# Patient Record
Sex: FEMALE | Race: WHITE
[De-identification: names, ages, dates, MRNs, and addresses within clinical notes are randomized per-mention and may not be internally consistent; named-entity substitution may affect disease eponyms.]

---

## 2018-06-22 ENCOUNTER — HOSPITAL ENCOUNTER (INPATIENT)
Age: 68
LOS: 5 days | Discharge: HOME | DRG: 641 | End: 2018-06-27

## 2018-06-22 ENCOUNTER — HOSPITAL ENCOUNTER (INPATIENT)
Dept: HOSPITAL 91 - E/R | Age: 68
LOS: 5 days | Discharge: HOME | DRG: 641 | End: 2018-06-27
Payer: MEDICAID

## 2018-06-22 DIAGNOSIS — N17.9: ICD-10-CM

## 2018-06-22 DIAGNOSIS — E03.9: ICD-10-CM

## 2018-06-22 DIAGNOSIS — I45.81: ICD-10-CM

## 2018-06-22 DIAGNOSIS — I10: ICD-10-CM

## 2018-06-22 DIAGNOSIS — E83.51: Primary | ICD-10-CM

## 2018-06-22 LAB
ADD MAN DIFF?: NO
ADD UMIC: YES
ALANINE AMINOTRANSFERASE: 22 IU/L (ref 13–69)
ALBUMIN/GLOBULIN RATIO: 1.12
ALBUMIN: 4.5 G/DL (ref 3.3–4.9)
ALKALINE PHOSPHATASE: 87 IU/L (ref 42–121)
ANION GAP: 21 (ref 8–16)
ASPARTATE AMINO TRANSFERASE: 18 IU/L (ref 15–46)
BASOPHIL #: 0 10^3/UL (ref 0–0.1)
BASOPHILS %: 0.4 % (ref 0–2)
BILIRUBIN,DIRECT: 0 MG/DL (ref 0–0.2)
BILIRUBIN,TOTAL: 0.3 MG/DL (ref 0.2–1.3)
BLOOD UREA NITROGEN: 27 MG/DL (ref 7–20)
CALCIUM: 5.7 MG/DL (ref 8.4–10.2)
CARBON DIOXIDE: 27 MMOL/L (ref 21–31)
CHLORIDE: 99 MMOL/L (ref 97–110)
CREATININE: 1.38 MG/DL (ref 0.44–1)
EOSINOPHILS #: 0.1 10^3/UL (ref 0–0.5)
EOSINOPHILS %: 1 % (ref 0–7)
GLOBULIN: 4 G/DL (ref 1.3–3.2)
GLUCOSE: 109 MG/DL (ref 70–220)
HEMATOCRIT: 36.1 % (ref 37–47)
HEMOGLOBIN: 11.1 G/DL (ref 12–16)
IONIZED CALCIUM: 0.7 MMOL/L (ref 1.1–1.4)
LIPASE: 281 U/L (ref 23–300)
LYMPHOCYTES #: 2.6 10^3/UL (ref 0.8–2.9)
LYMPHOCYTES %: 23.9 % (ref 15–51)
MAGNESIUM: 1.8 MG/DL (ref 1.7–2.5)
MEAN CORPUSCULAR HEMOGLOBIN: 24.9 PG (ref 29–33)
MEAN CORPUSCULAR HGB CONC: 30.7 G/DL (ref 32–37)
MEAN CORPUSCULAR VOLUME: 80.9 FL (ref 82–101)
MEAN PLATELET VOLUME: 10.4 FL (ref 7.4–10.4)
MONOCYTE #: 0.8 10^3/UL (ref 0.3–0.9)
MONOCYTES %: 7.9 % (ref 0–11)
NEUTROPHIL #: 7.1 10^3/UL (ref 1.6–7.5)
NEUTROPHILS %: 66.3 % (ref 39–77)
NUCLEATED RED BLOOD CELLS #: 0 10^3/UL (ref 0–0)
NUCLEATED RED BLOOD CELLS%: 0 /100WBC (ref 0–0)
PHOSPHORUS: 7.4 MG/DL (ref 2.5–4.9)
PLATELET COUNT: 323 10^3/UL (ref 140–415)
POTASSIUM: 4.5 MMOL/L (ref 3.5–5.1)
RED BLOOD COUNT: 4.46 10^6/UL (ref 4.2–5.4)
RED CELL DISTRIBUTION WIDTH: 16.5 % (ref 11.5–14.5)
SODIUM: 142 MMOL/L (ref 135–144)
TOTAL PROTEIN: 8.5 G/DL (ref 6.1–8.1)
UR ASCORBIC ACID: NEGATIVE MG/DL
UR BACTERIA: (no result) /HPF
UR BILIRUBIN (DIP): NEGATIVE MG/DL
UR BLOOD (DIP): (no result) MG/DL
UR CLARITY: CLEAR
UR COLOR: YELLOW
UR GLUCOSE (DIP): NEGATIVE MG/DL
UR KETONES (DIP): NEGATIVE MG/DL
UR LEUKOCYTE ESTERASE (DIP): NEGATIVE LEU/UL
UR NITRITE (DIP): NEGATIVE MG/DL
UR NONSQUAMOUS EPITHELIAL CELL: 1 /HPF
UR PH (DIP): 5 (ref 5–9)
UR RBC: 1 /HPF (ref 0–5)
UR SPECIFIC GRAVITY (DIP): 1.01 (ref 1–1.03)
UR SQUAMOUS EPITHELIAL CELL: (no result) /HPF
UR TOTAL PROTEIN (DIP): NEGATIVE MG/DL
UR UROBILINOGEN (DIP): NEGATIVE MG/DL
UR WBC: 1 /HPF (ref 0–5)
WHITE BLOOD COUNT: 10.7 10^3/UL (ref 4.8–10.8)

## 2018-06-22 PROCEDURE — 71045 X-RAY EXAM CHEST 1 VIEW: CPT

## 2018-06-22 PROCEDURE — 84100 ASSAY OF PHOSPHORUS: CPT

## 2018-06-22 PROCEDURE — 84443 ASSAY THYROID STIM HORMONE: CPT

## 2018-06-22 PROCEDURE — 82306 VITAMIN D 25 HYDROXY: CPT

## 2018-06-22 PROCEDURE — 83970 ASSAY OF PARATHORMONE: CPT

## 2018-06-22 PROCEDURE — 80053 COMPREHEN METABOLIC PANEL: CPT

## 2018-06-22 PROCEDURE — 80069 RENAL FUNCTION PANEL: CPT

## 2018-06-22 PROCEDURE — 82728 ASSAY OF FERRITIN: CPT

## 2018-06-22 PROCEDURE — 82607 VITAMIN B-12: CPT

## 2018-06-22 PROCEDURE — 83735 ASSAY OF MAGNESIUM: CPT

## 2018-06-22 PROCEDURE — 76536 US EXAM OF HEAD AND NECK: CPT

## 2018-06-22 PROCEDURE — 82330 ASSAY OF CALCIUM: CPT

## 2018-06-22 PROCEDURE — 82652 VIT D 1 25-DIHYDROXY: CPT

## 2018-06-22 PROCEDURE — 81001 URINALYSIS AUTO W/SCOPE: CPT

## 2018-06-22 PROCEDURE — 80048 BASIC METABOLIC PNL TOTAL CA: CPT

## 2018-06-22 PROCEDURE — 83690 ASSAY OF LIPASE: CPT

## 2018-06-22 PROCEDURE — 36415 COLL VENOUS BLD VENIPUNCTURE: CPT

## 2018-06-22 PROCEDURE — 99285 EMERGENCY DEPT VISIT HI MDM: CPT

## 2018-06-22 PROCEDURE — 93005 ELECTROCARDIOGRAM TRACING: CPT

## 2018-06-22 PROCEDURE — 81003 URINALYSIS AUTO W/O SCOPE: CPT

## 2018-06-22 PROCEDURE — 82310 ASSAY OF CALCIUM: CPT

## 2018-06-22 PROCEDURE — 96374 THER/PROPH/DIAG INJ IV PUSH: CPT

## 2018-06-22 PROCEDURE — 85025 COMPLETE CBC W/AUTO DIFF WBC: CPT

## 2018-06-22 PROCEDURE — 83540 ASSAY OF IRON: CPT

## 2018-06-22 RX ADMIN — THIAMINE HYDROCHLORIDE 1 MLS/HR: 100 INJECTION, SOLUTION INTRAMUSCULAR; INTRAVENOUS at 18:25

## 2018-06-22 RX ADMIN — MAGNESIUM OXIDE TAB 400 MG (241.3 MG ELEMENTAL MG) 1 MG: 400 (241.3 MG) TAB at 19:38

## 2018-06-22 RX ADMIN — CALCIUM GLUCONATE 1 MLS/HR: 94 INJECTION, SOLUTION INTRAVENOUS at 19:38

## 2018-06-23 LAB
ADD MAN DIFF?: NO
ADD UMIC: NO
ALANINE AMINOTRANSFERASE: 22 IU/L (ref 13–69)
ALBUMIN/GLOBULIN RATIO: 1.17
ALBUMIN: 4 G/DL (ref 3.3–4.9)
ALKALINE PHOSPHATASE: 77 IU/L (ref 42–121)
ANION GAP: 19 (ref 8–16)
ASPARTATE AMINO TRANSFERASE: 15 IU/L (ref 15–46)
BASOPHIL #: 0.1 10^3/UL (ref 0–0.1)
BASOPHILS %: 0.4 % (ref 0–2)
BILIRUBIN,DIRECT: 0 MG/DL (ref 0–0.2)
BILIRUBIN,TOTAL: 0.1 MG/DL (ref 0.2–1.3)
BLOOD UREA NITROGEN: 24 MG/DL (ref 7–20)
CALCIUM: 5.9 MG/DL (ref 8.4–10.2)
CALCIUM: 6.5 MG/DL (ref 8.4–10.2)
CARBON DIOXIDE: 29 MMOL/L (ref 21–31)
CHLORIDE: 99 MMOL/L (ref 97–110)
CREATININE: 1.18 MG/DL (ref 0.44–1)
EOSINOPHILS #: 0.1 10^3/UL (ref 0–0.5)
EOSINOPHILS %: 0.4 % (ref 0–7)
GLOBULIN: 3.4 G/DL (ref 1.3–3.2)
GLUCOSE: 104 MG/DL (ref 70–220)
HEMATOCRIT: 32.6 % (ref 37–47)
HEMOGLOBIN: 10.2 G/DL (ref 12–16)
IONIZED CALCIUM: 0.7 MMOL/L (ref 1.1–1.4)
LYMPHOCYTES #: 1.5 10^3/UL (ref 0.8–2.9)
LYMPHOCYTES %: 12.6 % (ref 15–51)
MAGNESIUM: 1.9 MG/DL (ref 1.7–2.5)
MEAN CORPUSCULAR HEMOGLOBIN: 25.1 PG (ref 29–33)
MEAN CORPUSCULAR HGB CONC: 31.3 G/DL (ref 32–37)
MEAN CORPUSCULAR VOLUME: 80.3 FL (ref 82–101)
MEAN PLATELET VOLUME: 10.9 FL (ref 7.4–10.4)
MONOCYTE #: 1 10^3/UL (ref 0.3–0.9)
MONOCYTES %: 8.4 % (ref 0–11)
NEUTROPHIL #: 9.2 10^3/UL (ref 1.6–7.5)
NEUTROPHILS %: 77.8 % (ref 39–77)
NUCLEATED RED BLOOD CELLS #: 0 10^3/UL (ref 0–0)
NUCLEATED RED BLOOD CELLS%: 0 /100WBC (ref 0–0)
PLATELET COUNT: 300 10^3/UL (ref 140–415)
POTASSIUM: 3.8 MMOL/L (ref 3.5–5.1)
PROTEIN URINE: 8 MG/DL (ref 0–11.9)
RED BLOOD COUNT: 4.06 10^6/UL (ref 4.2–5.4)
RED CELL DISTRIBUTION WIDTH: 16.4 % (ref 11.5–14.5)
SODIUM: 143 MMOL/L (ref 135–144)
THYROID STIMULATING HORMONE: 2.24 MIU/L (ref 0.47–4.68)
TOTAL PROTEIN: 7.4 G/DL (ref 6.1–8.1)
UR ASCORBIC ACID: NEGATIVE MG/DL
UR BILIRUBIN (DIP): NEGATIVE MG/DL
UR BLOOD (DIP): NEGATIVE MG/DL
UR CLARITY: CLEAR
UR COLOR: YELLOW
UR GLUCOSE (DIP): NEGATIVE MG/DL
UR KETONES (DIP): (no result) MG/DL
UR LEUKOCYTE ESTERASE (DIP): NEGATIVE LEU/UL
UR NITRITE (DIP): NEGATIVE MG/DL
UR PH (DIP): 6 (ref 5–9)
UR SPECIFIC GRAVITY (DIP): 1.01 (ref 1–1.03)
UR TOTAL PROTEIN (DIP): NEGATIVE MG/DL
UR UROBILINOGEN (DIP): NEGATIVE MG/DL
WHITE BLOOD COUNT: 11.8 10^3/UL (ref 4.8–10.8)

## 2018-06-23 RX ADMIN — CALCIUM 1 GM: 500 TABLET ORAL at 12:53

## 2018-06-23 RX ADMIN — THIAMINE HYDROCHLORIDE 1 MLS/HR: 100 INJECTION, SOLUTION INTRAMUSCULAR; INTRAVENOUS at 15:24

## 2018-06-23 RX ADMIN — MORPHINE SULFATE 1 MG: 2 INJECTION, SOLUTION INTRAMUSCULAR; INTRAVENOUS at 09:50

## 2018-06-23 RX ADMIN — HYDRALAZINE HYDROCHLORIDE 1 MG: 20 INJECTION INTRAMUSCULAR; INTRAVENOUS at 09:51

## 2018-06-23 RX ADMIN — ACETAMINOPHEN 1 MG: 325 TABLET, FILM COATED ORAL at 19:42

## 2018-06-23 RX ADMIN — CALCIUM 1 GM: 500 TABLET ORAL at 20:50

## 2018-06-23 RX ADMIN — HYDRALAZINE HYDROCHLORIDE 1 MG: 20 INJECTION INTRAMUSCULAR; INTRAVENOUS at 01:44

## 2018-06-23 RX ADMIN — LEVOTHYROXINE SODIUM 1 MCG: 100 TABLET ORAL at 06:18

## 2018-06-23 RX ADMIN — AMLODIPINE BESYLATE 1 MG: 10 TABLET ORAL at 11:32

## 2018-06-23 RX ADMIN — CALCIUM ACETATE 1 MG: 667 CAPSULE ORAL at 12:53

## 2018-06-23 RX ADMIN — CALCIUM ACETATE 1 MG: 667 CAPSULE ORAL at 17:11

## 2018-06-23 RX ADMIN — CALCIUM GLUCONATE 1 MLS/HR: 94 INJECTION, SOLUTION INTRAVENOUS at 09:50

## 2018-06-23 RX ADMIN — THIAMINE HYDROCHLORIDE 1 MLS/HR: 100 INJECTION, SOLUTION INTRAMUSCULAR; INTRAVENOUS at 23:01

## 2018-06-23 RX ADMIN — ACETAMINOPHEN 1 MG: 325 TABLET, FILM COATED ORAL at 11:32

## 2018-06-23 RX ADMIN — AMLODIPINE BESYLATE 1 MG: 5 TABLET ORAL at 19:43

## 2018-06-23 RX ADMIN — CALCIUM GLUCONATE 1 MLS/HR: 94 INJECTION, SOLUTION INTRAVENOUS at 15:24

## 2018-06-23 RX ADMIN — HYDRALAZINE HYDROCHLORIDE 1 MG: 20 INJECTION INTRAMUSCULAR; INTRAVENOUS at 19:43

## 2018-06-24 LAB
ADD MAN DIFF?: NO
ALANINE AMINOTRANSFERASE: 18 IU/L (ref 13–69)
ALBUMIN/GLOBULIN RATIO: 1.2
ALBUMIN: 4.1 G/DL (ref 3.3–4.9)
ALKALINE PHOSPHATASE: 83 IU/L (ref 42–121)
ANION GAP: 20 (ref 8–16)
ASPARTATE AMINO TRANSFERASE: 14 IU/L (ref 15–46)
BASOPHIL #: 0.1 10^3/UL (ref 0–0.1)
BASOPHILS %: 0.4 % (ref 0–2)
BILIRUBIN,DIRECT: 0 MG/DL (ref 0–0.2)
BILIRUBIN,TOTAL: 0.3 MG/DL (ref 0.2–1.3)
BLOOD UREA NITROGEN: 13 MG/DL (ref 7–20)
CALCIUM: 7.4 MG/DL (ref 8.4–10.2)
CARBON DIOXIDE: 26 MMOL/L (ref 21–31)
CHLORIDE: 97 MMOL/L (ref 97–110)
CREATININE: 0.87 MG/DL (ref 0.44–1)
EOSINOPHILS #: 0 10^3/UL (ref 0–0.5)
EOSINOPHILS %: 0.1 % (ref 0–7)
GLOBULIN: 3.4 G/DL (ref 1.3–3.2)
GLUCOSE: 124 MG/DL (ref 70–220)
HEMATOCRIT: 34.8 % (ref 37–47)
HEMOGLOBIN: 10.7 G/DL (ref 12–16)
LYMPHOCYTES #: 1.2 10^3/UL (ref 0.8–2.9)
LYMPHOCYTES %: 9.3 % (ref 15–51)
MAGNESIUM: 1.5 MG/DL (ref 1.7–2.5)
MEAN CORPUSCULAR HEMOGLOBIN: 24.9 PG (ref 29–33)
MEAN CORPUSCULAR HGB CONC: 30.7 G/DL (ref 32–37)
MEAN CORPUSCULAR VOLUME: 81.1 FL (ref 82–101)
MEAN PLATELET VOLUME: 11.1 FL (ref 7.4–10.4)
MONOCYTE #: 0.8 10^3/UL (ref 0.3–0.9)
MONOCYTES %: 6.3 % (ref 0–11)
NEUTROPHIL #: 11.1 10^3/UL (ref 1.6–7.5)
NEUTROPHILS %: 82.9 % (ref 39–77)
NUCLEATED RED BLOOD CELLS #: 0 10^3/UL (ref 0–0)
NUCLEATED RED BLOOD CELLS%: 0 /100WBC (ref 0–0)
PHOSPHORUS: 5.6 MG/DL (ref 2.5–4.9)
PLATELET COUNT: 336 10^3/UL (ref 140–415)
POTASSIUM: 3.8 MMOL/L (ref 3.5–5.1)
RED BLOOD COUNT: 4.29 10^6/UL (ref 4.2–5.4)
RED CELL DISTRIBUTION WIDTH: 16.7 % (ref 11.5–14.5)
SODIUM: 139 MMOL/L (ref 135–144)
TOTAL PROTEIN: 7.5 G/DL (ref 6.1–8.1)
WHITE BLOOD COUNT: 13.4 10^3/UL (ref 4.8–10.8)

## 2018-06-24 RX ADMIN — PYRIDOXINE HYDROCHLORIDE 1 MLS/HR: 100 INJECTION, SOLUTION INTRAMUSCULAR; INTRAVENOUS at 11:08

## 2018-06-24 RX ADMIN — CALCIUM ACETATE 1 MG: 667 CAPSULE ORAL at 09:43

## 2018-06-24 RX ADMIN — CALCIUM 1 GM: 500 TABLET ORAL at 09:43

## 2018-06-24 RX ADMIN — CALCIUM ACETATE 1 MG: 667 CAPSULE ORAL at 17:24

## 2018-06-24 RX ADMIN — AMLODIPINE BESYLATE 1 MG: 10 TABLET ORAL at 09:43

## 2018-06-24 RX ADMIN — ONDANSETRON HYDROCHLORIDE 1 MG: 2 INJECTION, SOLUTION INTRAMUSCULAR; INTRAVENOUS at 11:09

## 2018-06-24 RX ADMIN — HYDRALAZINE HYDROCHLORIDE 1 MG: 20 INJECTION INTRAMUSCULAR; INTRAVENOUS at 20:30

## 2018-06-24 RX ADMIN — CALCIUM ACETATE 1 MG: 667 CAPSULE ORAL at 12:42

## 2018-06-24 RX ADMIN — LEVOTHYROXINE SODIUM 1 MCG: 100 TABLET ORAL at 06:07

## 2018-06-24 RX ADMIN — HYDRALAZINE HYDROCHLORIDE 1 MG: 20 INJECTION INTRAMUSCULAR; INTRAVENOUS at 03:41

## 2018-06-24 RX ADMIN — THIAMINE HYDROCHLORIDE 1 MLS/HR: 100 INJECTION, SOLUTION INTRAMUSCULAR; INTRAVENOUS at 06:41

## 2018-06-24 RX ADMIN — CALCIUM 1 GM: 500 TABLET ORAL at 20:29

## 2018-06-24 RX ADMIN — MAGNESIUM SULFATE HEPTAHYDRATE 1 MLS/HR: 40 INJECTION, SOLUTION INTRAVENOUS at 11:08

## 2018-06-25 LAB
ADD MAN DIFF?: NO
ALANINE AMINOTRANSFERASE: 21 IU/L (ref 13–69)
ALBUMIN/GLOBULIN RATIO: 1.15
ALBUMIN: 3.7 G/DL (ref 3.3–4.9)
ALKALINE PHOSPHATASE: 68 IU/L (ref 42–121)
ANION GAP: 14 (ref 8–16)
ASPARTATE AMINO TRANSFERASE: 14 IU/L (ref 15–46)
BASOPHIL #: 0 10^3/UL (ref 0–0.1)
BASOPHILS %: 0.2 % (ref 0–2)
BILIRUBIN,DIRECT: 0 MG/DL (ref 0–0.2)
BILIRUBIN,TOTAL: 0.3 MG/DL (ref 0.2–1.3)
BLOOD UREA NITROGEN: 17 MG/DL (ref 7–20)
CALCIUM: 7 MG/DL (ref 8.4–10.2)
CARBON DIOXIDE: 32 MMOL/L (ref 21–31)
CHLORIDE: 96 MMOL/L (ref 97–110)
CREATININE: 1.05 MG/DL (ref 0.44–1)
EOSINOPHILS #: 0.1 10^3/UL (ref 0–0.5)
EOSINOPHILS %: 0.4 % (ref 0–7)
GLOBULIN: 3.2 G/DL (ref 1.3–3.2)
GLUCOSE: 104 MG/DL (ref 70–220)
HEMATOCRIT: 31 % (ref 37–47)
HEMOGLOBIN: 9.7 G/DL (ref 12–16)
LYMPHOCYTES #: 1.5 10^3/UL (ref 0.8–2.9)
LYMPHOCYTES %: 12.3 % (ref 15–51)
MAGNESIUM: 2.1 MG/DL (ref 1.7–2.5)
MEAN CORPUSCULAR HEMOGLOBIN: 25.5 PG (ref 29–33)
MEAN CORPUSCULAR HGB CONC: 31.3 G/DL (ref 32–37)
MEAN CORPUSCULAR VOLUME: 81.6 FL (ref 82–101)
MEAN PLATELET VOLUME: 10.7 FL (ref 7.4–10.4)
MONOCYTE #: 0.9 10^3/UL (ref 0.3–0.9)
MONOCYTES %: 7.5 % (ref 0–11)
NEUTROPHIL #: 9.9 10^3/UL (ref 1.6–7.5)
NEUTROPHILS %: 79.2 % (ref 39–77)
NUCLEATED RED BLOOD CELLS #: 0 10^3/UL (ref 0–0)
NUCLEATED RED BLOOD CELLS%: 0 /100WBC (ref 0–0)
PHOSPHORUS: 5.1 MG/DL (ref 2.5–4.9)
PLATELET COUNT: 301 10^3/UL (ref 140–415)
POTASSIUM: 4.3 MMOL/L (ref 3.5–5.1)
PTH-C SERPL-SCNC: 6.7 MG/DL (ref 8.6–10.4)
RED BLOOD COUNT: 3.8 10^6/UL (ref 4.2–5.4)
RED CELL DISTRIBUTION WIDTH: 16.7 % (ref 11.5–14.5)
SODIUM: 138 MMOL/L (ref 135–144)
TOTAL PROTEIN: 6.9 G/DL (ref 6.1–8.1)
WHITE BLOOD COUNT: 12.6 10^3/UL (ref 4.8–10.8)

## 2018-06-25 RX ADMIN — CALCIUM 1 GM: 500 TABLET ORAL at 13:07

## 2018-06-25 RX ADMIN — CALCITRIOL CAPSULES 0.25 MCG 1 MCG: 0.25 CAPSULE ORAL at 08:59

## 2018-06-25 RX ADMIN — AMLODIPINE BESYLATE 1 MG: 10 TABLET ORAL at 09:01

## 2018-06-25 RX ADMIN — CALCIUM 1 GM: 500 TABLET ORAL at 21:00

## 2018-06-25 RX ADMIN — CALCIUM ACETATE 1 MG: 667 CAPSULE ORAL at 17:26

## 2018-06-25 RX ADMIN — LEVOTHYROXINE SODIUM 1 MCG: 100 TABLET ORAL at 06:13

## 2018-06-25 RX ADMIN — CALCIUM ACETATE 1 MG: 667 CAPSULE ORAL at 13:07

## 2018-06-25 RX ADMIN — CALCIUM 1 GM: 500 TABLET ORAL at 09:00

## 2018-06-25 RX ADMIN — CALCIUM ACETATE 1 MG: 667 CAPSULE ORAL at 09:00

## 2018-06-26 LAB
ADD MAN DIFF?: NO
ANION GAP: 15 (ref 8–16)
BASOPHIL #: 0 10^3/UL (ref 0–0.1)
BASOPHILS %: 0.3 % (ref 0–2)
BLOOD UREA NITROGEN: 17 MG/DL (ref 7–20)
CALCIUM: 7.2 MG/DL (ref 8.4–10.2)
CARBON DIOXIDE: 32 MMOL/L (ref 21–31)
CHLORIDE: 95 MMOL/L (ref 97–110)
CREATININE: 0.97 MG/DL (ref 0.44–1)
EOSINOPHILS #: 0.1 10^3/UL (ref 0–0.5)
EOSINOPHILS %: 0.8 % (ref 0–7)
GLUCOSE: 102 MG/DL (ref 70–220)
HEMATOCRIT: 29.2 % (ref 37–47)
HEMOGLOBIN: 9.1 G/DL (ref 12–16)
LYMPHOCYTES #: 2 10^3/UL (ref 0.8–2.9)
LYMPHOCYTES %: 15.3 % (ref 15–51)
MAGNESIUM: 1.9 MG/DL (ref 1.7–2.5)
MEAN CORPUSCULAR HEMOGLOBIN: 25.5 PG (ref 29–33)
MEAN CORPUSCULAR HGB CONC: 31.2 G/DL (ref 32–37)
MEAN CORPUSCULAR VOLUME: 81.8 FL (ref 82–101)
MEAN PLATELET VOLUME: 10.7 FL (ref 7.4–10.4)
MONOCYTE #: 1.2 10^3/UL (ref 0.3–0.9)
MONOCYTES %: 9.3 % (ref 0–11)
NEUTROPHIL #: 9.6 10^3/UL (ref 1.6–7.5)
NEUTROPHILS %: 73.8 % (ref 39–77)
NUCLEATED RED BLOOD CELLS #: 0 10^3/UL (ref 0–0)
NUCLEATED RED BLOOD CELLS%: 0 /100WBC (ref 0–0)
PHOSPHORUS: 5.5 MG/DL (ref 2.5–4.9)
PLATELET COUNT: 279 10^3/UL (ref 140–415)
POTASSIUM: 4.2 MMOL/L (ref 3.5–5.1)
PTH-C SERPL-SCNC: 7.2 MG/DL (ref 8.6–10.4)
PTH-INTACT SERPL-SCNC: 9 PG/ML (ref 14–64)
RED BLOOD COUNT: 3.57 10^6/UL (ref 4.2–5.4)
RED CELL DISTRIBUTION WIDTH: 16.2 % (ref 11.5–14.5)
SODIUM: 138 MMOL/L (ref 135–144)
WHITE BLOOD COUNT: 13 10^3/UL (ref 4.8–10.8)

## 2018-06-26 RX ADMIN — AMLODIPINE BESYLATE 1 MG: 10 TABLET ORAL at 09:51

## 2018-06-26 RX ADMIN — CALCIUM 1 GM: 500 TABLET ORAL at 20:46

## 2018-06-26 RX ADMIN — CALCIUM ACETATE 1 MG: 667 CAPSULE ORAL at 17:56

## 2018-06-26 RX ADMIN — CALCIUM 1 GM: 500 TABLET ORAL at 12:38

## 2018-06-26 RX ADMIN — LEVOTHYROXINE SODIUM 1 MCG: 100 TABLET ORAL at 06:17

## 2018-06-26 RX ADMIN — ERGOCALCIFEROL 1 UNIT: 1.25 CAPSULE ORAL at 16:31

## 2018-06-26 RX ADMIN — CALCIUM ACETATE 1 MG: 667 CAPSULE ORAL at 09:51

## 2018-06-26 RX ADMIN — ACETAMINOPHEN 1 MG: 325 TABLET, FILM COATED ORAL at 00:36

## 2018-06-26 RX ADMIN — CALCIUM 1 GM: 500 TABLET ORAL at 09:52

## 2018-06-26 RX ADMIN — CALCITRIOL 1 MCG: 1 INJECTION INTRAVENOUS at 16:32

## 2018-06-26 RX ADMIN — CALCITRIOL CAPSULES 0.25 MCG 1 MCG: 0.25 CAPSULE ORAL at 09:52

## 2018-06-26 RX ADMIN — HYDROCHLOROTHIAZIDE 1 MG: 12.5 CAPSULE ORAL at 16:31

## 2018-06-26 RX ADMIN — CALCIUM ACETATE 1 MG: 667 CAPSULE ORAL at 12:38

## 2018-06-26 RX ADMIN — LABETALOL HYDROCHLORIDE 1 MG: 5 INJECTION, SOLUTION INTRAVENOUS at 21:49

## 2018-06-27 LAB
% IRON SATURATION: 6 % SAT (ref 22–52)
ADD MAN DIFF?: NO
ALBUMIN: 3.9 G/DL (ref 3.3–4.9)
ANION GAP: 16 (ref 8–16)
BASOPHIL #: 0 10^3/UL (ref 0–0.1)
BASOPHILS %: 0.3 % (ref 0–2)
BLOOD UREA NITROGEN: 17 MG/DL (ref 7–20)
CALCIUM: 7.9 MG/DL (ref 8.4–10.2)
CARBON DIOXIDE: 30 MMOL/L (ref 21–31)
CHLORIDE: 97 MMOL/L (ref 97–110)
CREATININE: 0.89 MG/DL (ref 0.44–1)
EOSINOPHILS #: 0.2 10^3/UL (ref 0–0.5)
EOSINOPHILS %: 1.3 % (ref 0–7)
FERRITIN: 31.7 NG/ML (ref 11.1–264)
GLUCOSE: 105 MG/DL (ref 70–220)
HEMATOCRIT: 32.1 % (ref 37–47)
HEMOGLOBIN: 10.1 G/DL (ref 12–16)
IRON: 20 UG/DL (ref 35–150)
LYMPHOCYTES #: 1.7 10^3/UL (ref 0.8–2.9)
LYMPHOCYTES %: 13.7 % (ref 15–51)
MAGNESIUM: 1.7 MG/DL (ref 1.7–2.5)
MEAN CORPUSCULAR HEMOGLOBIN: 25.5 PG (ref 29–33)
MEAN CORPUSCULAR HGB CONC: 31.5 G/DL (ref 32–37)
MEAN CORPUSCULAR VOLUME: 81.1 FL (ref 82–101)
MEAN PLATELET VOLUME: 11.2 FL (ref 7.4–10.4)
MONOCYTE #: 0.9 10^3/UL (ref 0.3–0.9)
MONOCYTES %: 7.4 % (ref 0–11)
NEUTROPHIL #: 9.4 10^3/UL (ref 1.6–7.5)
NEUTROPHILS %: 76.7 % (ref 39–77)
NUCLEATED RED BLOOD CELLS #: 0 10^3/UL (ref 0–0)
NUCLEATED RED BLOOD CELLS%: 0 /100WBC (ref 0–0)
PHOSPHORUS: 5.6 MG/DL (ref 2.5–4.9)
PHOSPHORUS: 5.6 MG/DL (ref 2.5–4.9)
PLATELET COUNT: 271 10^3/UL (ref 140–415)
POTASSIUM: 4.2 MMOL/L (ref 3.5–5.1)
PTH-INTACT SERPL-SCNC: 7 PG/ML (ref 14–64)
RED BLOOD COUNT: 3.96 10^6/UL (ref 4.2–5.4)
RED CELL DISTRIBUTION WIDTH: 16.2 % (ref 11.5–14.5)
SODIUM: 139 MMOL/L (ref 135–144)
TOTAL IRON BINDING CAPACITY: 328 UG/DL (ref 241–421)
VITAMIN B12: 568 PG/ML (ref 239–931)
WHITE BLOOD COUNT: 12.3 10^3/UL (ref 4.8–10.8)

## 2018-06-27 RX ADMIN — MAGNESIUM SULFATE HEPTAHYDRATE 1 MLS/HR: 40 INJECTION, SOLUTION INTRAVENOUS at 09:22

## 2018-06-27 RX ADMIN — AMLODIPINE BESYLATE 1 MG: 10 TABLET ORAL at 09:21

## 2018-06-27 RX ADMIN — CALCIUM ACETATE 1 MG: 667 CAPSULE ORAL at 09:21

## 2018-06-27 RX ADMIN — CALCIUM 1 GM: 500 TABLET ORAL at 09:21

## 2018-06-27 RX ADMIN — CALCIUM ACETATE 1 MG: 667 CAPSULE ORAL at 12:51

## 2018-06-27 RX ADMIN — MAGNESIUM SULFATE HEPTAHYDRATE 1 MLS/HR: 40 INJECTION, SOLUTION INTRAVENOUS at 12:51

## 2018-06-27 RX ADMIN — LEVOTHYROXINE SODIUM 1 MCG: 100 TABLET ORAL at 06:12

## 2018-06-27 RX ADMIN — CALCIUM 1 GM: 500 TABLET ORAL at 12:51

## 2018-06-27 RX ADMIN — CALCITRIOL CAPSULES 0.25 MCG 1 MCG: 0.25 CAPSULE ORAL at 09:22

## 2018-06-27 RX ADMIN — HYDROCHLOROTHIAZIDE 1 MG: 12.5 CAPSULE ORAL at 09:21

## 2018-12-10 ENCOUNTER — HOSPITAL ENCOUNTER (OUTPATIENT)
Dept: HOSPITAL 91 - SDS | Age: 68
Discharge: HOME | End: 2018-12-10
Payer: COMMERCIAL

## 2018-12-10 DIAGNOSIS — H25.12: Primary | ICD-10-CM

## 2018-12-10 DIAGNOSIS — I10: ICD-10-CM

## 2018-12-10 DIAGNOSIS — E03.9: ICD-10-CM

## 2018-12-10 PROCEDURE — 66984 XCAPSL CTRC RMVL W/O ECP: CPT

## 2018-12-10 RX ADMIN — LIDOCAINE HYDROCHLORIDE 1 ML: 10 INJECTION, SOLUTION EPIDURAL; INFILTRATION; INTRACAUDAL; PERINEURAL at 11:10

## 2018-12-10 RX ADMIN — PHENYLEPHRINE HYDROCHLORIDE 1 DROP: 100 SOLUTION/ DROPS OPHTHALMIC at 08:59

## 2018-12-10 RX ADMIN — CYCLOPENTOLATE HYDROCHLORIDE 1 DROP: 20 SOLUTION/ DROPS OPHTHALMIC at 08:59

## 2018-12-10 RX ADMIN — NEPAFENAC 1 DROP: 1 SUSPENSION/ DROPS OPHTHALMIC at 08:59

## 2018-12-10 RX ADMIN — MOXIFLOXACIN HYDROCHLORIDE 1 DROP: 5 SOLUTION/ DROPS OPHTHALMIC at 08:59

## 2019-07-10 ENCOUNTER — HOSPITAL ENCOUNTER (OUTPATIENT)
Dept: HOSPITAL 91 - SDS | Age: 69
Discharge: HOME | End: 2019-07-10
Payer: COMMERCIAL

## 2019-07-10 ENCOUNTER — HOSPITAL ENCOUNTER (OUTPATIENT)
Dept: HOSPITAL 10 - SDS | Age: 69
Discharge: HOME | End: 2019-07-10
Attending: OPHTHALMOLOGY
Payer: COMMERCIAL

## 2019-07-10 VITALS — RESPIRATION RATE: 17 BRPM | HEART RATE: 66 BPM | DIASTOLIC BLOOD PRESSURE: 56 MMHG | SYSTOLIC BLOOD PRESSURE: 137 MMHG

## 2019-07-10 VITALS — HEART RATE: 66 BPM | SYSTOLIC BLOOD PRESSURE: 135 MMHG | RESPIRATION RATE: 16 BRPM | DIASTOLIC BLOOD PRESSURE: 60 MMHG

## 2019-07-10 VITALS — RESPIRATION RATE: 18 BRPM | HEART RATE: 70 BPM | SYSTOLIC BLOOD PRESSURE: 140 MMHG | DIASTOLIC BLOOD PRESSURE: 65 MMHG

## 2019-07-10 VITALS — SYSTOLIC BLOOD PRESSURE: 140 MMHG | HEART RATE: 93 BPM | DIASTOLIC BLOOD PRESSURE: 64 MMHG | RESPIRATION RATE: 18 BRPM

## 2019-07-10 VITALS — DIASTOLIC BLOOD PRESSURE: 52 MMHG | HEART RATE: 66 BPM | SYSTOLIC BLOOD PRESSURE: 122 MMHG | RESPIRATION RATE: 16 BRPM

## 2019-07-10 VITALS — HEART RATE: 68 BPM | DIASTOLIC BLOOD PRESSURE: 67 MMHG | SYSTOLIC BLOOD PRESSURE: 110 MMHG | RESPIRATION RATE: 16 BRPM

## 2019-07-10 VITALS — DIASTOLIC BLOOD PRESSURE: 61 MMHG | SYSTOLIC BLOOD PRESSURE: 135 MMHG | RESPIRATION RATE: 17 BRPM | HEART RATE: 66 BPM

## 2019-07-10 VITALS — HEART RATE: 66 BPM | SYSTOLIC BLOOD PRESSURE: 135 MMHG | RESPIRATION RATE: 17 BRPM | DIASTOLIC BLOOD PRESSURE: 58 MMHG

## 2019-07-10 VITALS — HEART RATE: 66 BPM | RESPIRATION RATE: 17 BRPM | DIASTOLIC BLOOD PRESSURE: 58 MMHG | SYSTOLIC BLOOD PRESSURE: 133 MMHG

## 2019-07-10 VITALS — DIASTOLIC BLOOD PRESSURE: 49 MMHG | HEART RATE: 66 BPM | RESPIRATION RATE: 17 BRPM | SYSTOLIC BLOOD PRESSURE: 128 MMHG

## 2019-07-10 VITALS — SYSTOLIC BLOOD PRESSURE: 141 MMHG | RESPIRATION RATE: 18 BRPM | HEART RATE: 68 BPM | DIASTOLIC BLOOD PRESSURE: 49 MMHG

## 2019-07-10 VITALS — HEIGHT: 60 IN | BODY MASS INDEX: 38 KG/M2 | WEIGHT: 193.57 LBS

## 2019-07-10 VITALS — DIASTOLIC BLOOD PRESSURE: 59 MMHG | HEART RATE: 68 BPM | RESPIRATION RATE: 18 BRPM | SYSTOLIC BLOOD PRESSURE: 127 MMHG

## 2019-07-10 DIAGNOSIS — E03.9: ICD-10-CM

## 2019-07-10 DIAGNOSIS — I10: ICD-10-CM

## 2019-07-10 DIAGNOSIS — H25.11: Primary | ICD-10-CM

## 2019-07-10 PROCEDURE — 66984 XCAPSL CTRC RMVL W/O ECP: CPT

## 2019-07-10 PROCEDURE — V2632 POST CHMBR INTRAOCULAR LENS: HCPCS

## 2019-07-10 RX ADMIN — TIMOLOL MALEATE 1 DROP: 5 SOLUTION/ DROPS OPHTHALMIC at 08:08

## 2019-07-10 RX ADMIN — CYCLOPENTOLATE HYDROCHLORIDE 1 DROP: 20 SOLUTION/ DROPS OPHTHALMIC at 06:11

## 2019-07-10 RX ADMIN — LIDOCAINE HYDROCHLORIDE 1 ML: 10 INJECTION, SOLUTION EPIDURAL; INFILTRATION; INTRACAUDAL; PERINEURAL at 07:15

## 2019-07-10 RX ADMIN — MOXIFLOXACIN HYDROCHLORIDE 1 DROP: 5 SOLUTION/ DROPS OPHTHALMIC at 06:11

## 2019-07-10 RX ADMIN — NEPAFENAC 1 DROP: 1 SUSPENSION/ DROPS OPHTHALMIC at 06:12

## 2019-07-10 RX ADMIN — PHENYLEPHRINE HYDROCHLORIDE 1 DROP: 100 SOLUTION/ DROPS OPHTHALMIC at 06:12

## 2019-07-10 RX ADMIN — CARBACHOL 1 ML: 0.1 SOLUTION OPHTHALMIC at 08:07

## 2019-07-10 NOTE — HPN
Date/Time of Note


Date/Time of Note


DATE: 7/10/19 


TIME: 06:50





Interval H&P Admission Note


Pt. seen H&P reviewed:  No system changes











MONSE MENENDEZ MD                 Jul 10, 2019 06:51

## 2019-07-10 NOTE — PREAC
Date/Time of Note


Date/Time of Note


DATE: 7/10/19 


TIME: 07:16





Anesthesia Eval and Record


Evaluation


Time Pre-Procedure Interview


DATE: 7/10/19 


TIME: 07:16


Age


69


Sex


female


NPO:  8 hrs


Preoperative diagnosis


right Cataract


Planned procedure


right CE with IOL implant





Past Medical History


Past Medical History:  Includes


Cardio:  HTN


Endo:  Hypothyroid





Surgery & Anesthesia Issues


No known issue





Meds


Anticoagulation:  No


Beta Blocker within 24 hr:  Yes


Reported Medications


Cholecalciferol (Vitamin D3) 5,000 Unit Tablet, 5000 UNIT PO DAILY, TAB


   12/10/18


Carvedilol* (Carvedilol*) 12.5 Mg Tablet, 12.5 MG PO BID, #60 TAB


   12/10/18


Amlodipine Besylate* (Amlodipine Besylate*) 10 Mg Tablet, 10 MG PO DAILY, #30 


TAB


   12/10/18


Calcitriol* (Rocaltrol*) 0.25 Mcg Capsule, 0.25 MCG PO DAILY, CAP


   12/10/18


Calcium Acetate* (Calcium Acetate*) 667 Mg Capsule, 667 MG PO WITH MEALS, #30 


CAP


   12/10/18


Losartan Potassium* (Losartan Potassium*) 25 Mg Tablet, 25 MG PO DAILY, TAB


   12/10/18


Levothyroxine Sodium* (Levoxyl*) 88 Mcg Tablet, 88 MCG PO BEFORE BREAKFAST, #30 


TAB


   12/10/18





Current Medications


Cyclopentolate HCl (Cyclogyl 2% Oph) 1 drop Q5 MIN X 3 OPER  Last administered 


on 7/10/19at 06:11; Admin Dose 1 DROP;  Start 7/10/19 at 06:00


Phenylephrine HCl (Ak-Dilate 10%) 1 drop Q5 MIN X3 OPER  Last administered on 


7/10/19at 06:12; Admin Dose 1 DROP;  Start 7/10/19 at 06:00


Nepafenac ([place]  Oph) 1 drop Q5 MIN X3 OPER  Last administered on 7/10/19at 


06:12; Admin Dose 1 DROP;  Start 7/10/19 at 06:00


Moxifloxacin HCl (Vigamox) 1 drop ONCE OPER  Last administered on 7/10/19at 


06:11; Admin Dose 1 DROP;  Start 7/10/19 at 06:00;  Stop 7/10/19 at 17:00


Meds reviewed:  Yes





Allergies


Coded Allergies:  


     No Known Allergy (Unverified , 12/10/18)


Allergies Reviewed:  Yes





Labs/Studies


Labs Reviewed:  Reviewed by anesthesiologist


Pregnancy test:  N/A


Studies:  ECG





Pre-procedure Exam


Last vitals





Vital Signs


  Date      Temp  Pulse  Resp  B/P (MAP)   Pulse Ox  O2          O2 Flow    FiO2


Time                                                 Delivery    Rate


   7/10/19  98.2     93    18      140/64        92  Room Air


     06:21                           (89)





Airway:  Adequate mouth opening, Adequate thyromental dist


Mallampati:  Mallampati II


Teeth:  Normal (upper edentulous)


Lung:  Normal


Heart:  Normal





ASA Physical Status


ASA physical status:  2


Emergency:  None





Pre-operative Attestations


Prior to commencing anesthesia and surgery, the patient was re-evaluated, there 


was verification of:


*The patient's identity


*The results of appropriate recent lab work and preoperative vital signs


*The above evaluation not changing prior to induction


*Anesthetic plan, risk benefits, alternative and complications discussed with 


patient/family; questions answered; patient/family understands, accepts and wish


es to proceed.











JUAN DANIEL MONDRAGON CRNA       Jul 10, 2019 07:18

## 2019-07-10 NOTE — PAC
Date/Time of Note


Date/Time of Note


DATE: 7/10/19 


TIME: 08:16





Post-Anesthesia Notes


Post-Anesthesia Note


Last documented vital signs





Vital Signs


  Date      Temp  Pulse  Resp  B/P (MAP)   Pulse Ox  O2          O2 Flow    FiO2


Time                                                 Delivery    Rate


   7/10/19  98.2     93    18      140/64        92  Room Air


     06:21                           (89)





Activity:  WNL


Respiratory function:  WNL


Cardiovascular function:  WNL


Mental status:  Baseline


Pain reasonably controlled:  Yes


Hydration appropriate:  Yes


Nausea/Vomiting absent:  Yes


Comments


/62 Ht1463% HR 70 RR12 T 98F











JUAN DANIEL MONDRAGON CRNA       Jul 10, 2019 08:17

## 2019-07-10 NOTE — OPR
DATE OF OPERATION:  07/10/2019

 

 

SURGEON:  Monse Phillip MD

 

ASSISTANT:  None.  

 

ANESTHESIOLOGIST:  ____

 

PREOPERATIVE DIAGNOSIS:  Senile nuclear sclerotic cataract right eye.  

 

POSTOPERATIVE DIAGNOSIS:  Senile nuclear sclerotic cataract, right eye.

 

OPERATION:  Kelman phacoemulsification with implantation of intraocular lens right eye.

 

DESCRIPTION OF PROCEDURE:  Following standard preparation and draping of the patient, a lid speculum 
was placed for immobilization of the lids.  A SuperBlade incision was made at the corneal limbal junc
tion for access into the anterior chamber.  Approximately 0.03 mL of nonpreserved 1% Xylocaine was in
stilled into the anterior chamber, and after approximately 5 to 10 seconds, this was replaced with Vi
scoat.  A clear corneal incision was then made using the 3.2 mm keratome, following which an anterior
 circular capsulorrhexis was made.  The major portion of the lens cortex and nucleus were then disloc
ated from the capsular bag using hydrodissection.  The KPE tip was introduced into the eye and contro
lling tumbling of the lens with a 2-handed technique, the major portion of the lens cortex and nucleu
s was removed, maintaining the lens in the plane of the iris.  The remaining cortical material was re
moved via the irrigating aspirating instrument.  The capsular bag and the anterior chamber were now r
eformed using Viscoat.  The proper power lens was then placed in the capsular bag.  The viscoelastic 
was then removed from the eye and the eye reformed with balanced salt solution.  One 10-0 Vicryl sutu
re was then used to ensure closure of the corneal incision.  The eye was reformed to normal pressure 
using balanced salt solution.  The eye and cul-de-sacs were now simply flooded with 5% Betadine solut
ion.  One drop of Vigamox and 1 drop of Betagan solution were instilled into the eye.  A light pressu
re dressing was applied, and the patient was returned to the recovery room in satisfactory condition.


 

 

Dictated By: MONSE IBARRA/BAYRON

DD:    07/10/2019 11:54:25

DT:    07/10/2019 14:18:50

Conf#: [id number]

DID#:  [id number]